# Patient Record
(demographics unavailable — no encounter records)

---

## 2025-07-16 NOTE — HISTORY OF PRESENT ILLNESS
[Mother] : mother [Yes] : Patient goes to dentist yearly [Toothpaste] : Primary Fluoride Source: Toothpaste [FreeTextEntry7] : 15 yo [facility] [FreeTextEntry1] : MARISELA  is here for 15 year  well child visit  Menstruation:LMP 7/11/2025 regular Appetite: good no current concerns Drugs: does not use electronic nicotine delivery system, does not use tobacco, does not use drugs, does not drink alcohol.  Safety: uses safety belts/safety equipment .  Patient is doing well at home. Past medical history reviewed. Immunizations up to date Oral: , routine dental visits Behavior/ Activity: exercises 1 hour per day soccer regionals Safety: appropriately restrained in motor vehicle Sleeping: no concerns Urination and bowel moments normal Current grade to 10th grade had great year at [employer]  Parent(s) have current concerns or issues: doing well no current concerns re history of disordered eating  15 year with past  history of restrictive and disordered  eating and past history of self-induced vomiting, doing well in 2024 weight has increased , No current concerns not in therapy doing well  PAST HISTORY evaluated in our office 2/28/23 at that time not gaining weight decreased eating, self induced vomiting starting 12/2022 for one week stopped after parents found out  evaluated by nutritionist and followed by therapist. seen in office 7/11/23 for weight check, BMI significantly decreased over past 2 years from 61 to 5 percentile over past 2 years, and several pound weight loss over past few months was having headaches, resolved, started self-induced vomiting this t summer and stopped after parents discovered she was purging.. Over the past few weeks, they have changed nutritionist now Elizabeth Benton recommended by her therapist which has been a better fit with Marisela  doing well no current concern

## 2025-07-16 NOTE — HISTORY OF PRESENT ILLNESS
[Mother] : mother [Yes] : Patient goes to dentist yearly [Toothpaste] : Primary Fluoride Source: Toothpaste [FreeTextEntry7] : 15 yo [facility] [FreeTextEntry1] : MARISELA  is here for 15 year  well child visit  Menstruation:LMP 7/11/2025 regular Appetite: good no current concerns Drugs: does not use electronic nicotine delivery system, does not use tobacco, does not use drugs, does not drink alcohol.  Safety: uses safety belts/safety equipment .  Patient is doing well at home. Past medical history reviewed. Immunizations up to date Oral: , routine dental visits Behavior/ Activity: exercises 1 hour per day soccer regionals Safety: appropriately restrained in motor vehicle Sleeping: no concerns Urination and bowel moments normal Current grade to 10th grade had great year at [employer]  Parent(s) have current concerns or issues: doing well no current concerns re history of disordered eating  15 year with past  history of restrictive and disordered  eating and past history of self-induced vomiting, doing well in 2024 weight has increased , No current concerns not in therapy doing well  PAST HISTORY evaluated in our office 2/28/23 at that time not gaining weight decreased eating, self induced vomiting starting 12/2022 for one week stopped after parents found out  evaluated by nutritionist and followed by therapist. seen in office 7/11/23 for weight check, BMI significantly decreased over past 2 years from 61 to 5 percentile over past 2 years, and several pound weight loss over past few months was having headaches, resolved, started self-induced vomiting this t summer and stopped after parents discovered she was purging.. Over the past few weeks, they have changed nutritionist now Elizabeth Benton recommended by her therapist which has been a better fit with Marisela  doing well no current concern

## 2025-07-16 NOTE — PHYSICAL EXAM
[TextEntry] :   Gen: Awake, alert, not in distress well appearing Ears: bilateral tympanic membranes normal light reflex  normal canals Eyes: PERRL Pharynx: non erythematous, palate normal Neck: supple  Cardiac: normal rate, regular rhythm, S1,S2 normal, no murmur Lungs : clear to auscultation  Abdomen: soft, not tender, not distended, , no hepatosplenomegaly Musculoskeletal : full range of motion of hips, knees and ankles, normal gait Neuro: no focal deficits  Normal female external genitalia  menses Breasts Robert stage 4, no breast lesions Back: no scoliosis, normal spine

## 2025-07-16 NOTE — PLAN
[TextEntry] :  gardasil discussed  weight inc 8 pounds from last year, BMI 16%, menses regular COUNSELING/EDUCATION  Reviewed  5-2-1-0 Healthy Habits Questionnaire eat bf daily   CARE COORDINATION  reviewed  Immunizations reviewed         The following 11 to 14 year anticipatory guidance topics were discussed and/or handouts given: physical growth and development, social and academic competence, emotional well-being, risk reduction and injury prevention. Counseling for nutrition and physical activity was provided. Information discussed with patient and parent/guardian.   Sports/Physical Activity Participation Clearance: Full Activity without restrictions including Physical Education & Athletics   I have examined the above-named student and completed the preparticipation physical evaluation. The patient  athlete does not present apparent clinical contraindications to practice and participate in sport(s) as outlined above. . If conditions arise after the athlete has been cleared for participation, the physician may rescind the clearance until the problem is resolved and the potential consequences are completely explained to the athlete (and parents/guardians).     Follow up in one year.